# Patient Record
Sex: FEMALE | Race: WHITE | NOT HISPANIC OR LATINO | URBAN - METROPOLITAN AREA
[De-identification: names, ages, dates, MRNs, and addresses within clinical notes are randomized per-mention and may not be internally consistent; named-entity substitution may affect disease eponyms.]

---

## 2023-12-12 ENCOUNTER — APPOINTMENT (RX ONLY)
Dept: URBAN - METROPOLITAN AREA CLINIC 164 | Facility: CLINIC | Age: 18
Setting detail: DERMATOLOGY
End: 2023-12-12

## 2023-12-12 DIAGNOSIS — D485 NEOPLASM OF UNCERTAIN BEHAVIOR OF SKIN: ICD-10-CM

## 2023-12-12 PROBLEM — D48.5 NEOPLASM OF UNCERTAIN BEHAVIOR OF SKIN: Status: ACTIVE | Noted: 2023-12-12

## 2023-12-12 PROCEDURE — 99212 OFFICE O/P EST SF 10 MIN: CPT

## 2023-12-12 PROCEDURE — ? ADDITIONAL NOTES

## 2023-12-12 PROCEDURE — ? COUNSELING

## 2023-12-12 ASSESSMENT — LOCATION SIMPLE DESCRIPTION DERM: LOCATION SIMPLE: LEFT EAR

## 2023-12-12 ASSESSMENT — LOCATION DETAILED DESCRIPTION DERM: LOCATION DETAILED: LEFT POSTERIOR EARLOBE

## 2023-12-12 ASSESSMENT — LOCATION ZONE DERM: LOCATION ZONE: EAR

## 2023-12-12 NOTE — PROCEDURE: ADDITIONAL NOTES
Additional Notes: Photo reviewed, suspect abscess that was drained by outside physician and treated with antibiotics. Currently no visible or palpable lesion. Recommend rtc if recurs, reassures appears resolved. Do not feel related to piercing based on location from patient provided photograph
Detail Level: Simple
Render Risk Assessment In Note?: no

## 2023-12-12 NOTE — HPI: SKIN LESION
Is This A New Presentation, Or A Follow-Up?: Growth
treated_[email or web address]
What Type Of Note Output Would You Prefer (Optional)?: Bullet Format